# Patient Record
Sex: FEMALE
[De-identification: names, ages, dates, MRNs, and addresses within clinical notes are randomized per-mention and may not be internally consistent; named-entity substitution may affect disease eponyms.]

---

## 2018-06-11 LAB
ALBUMIN SERPL-MCNC: 3.3 G/DL (ref 3.5–5)
ALBUMIN/GLOB SERPL: 0.9 {RATIO} (ref 0.8–2)
ALP SERPL-CCNC: 133 IU/L (ref 40–150)
ALT SERPL-CCNC: 36 IU/L (ref 0–55)
ANION GAP SERPL CALC-SCNC: 12 MMOL/L (ref 8–16)
BASOPHILS # BLD AUTO: 0.1 10*3/UL (ref 0–0.1)
BASOPHILS NFR BLD AUTO: 0.6 % (ref 0–1)
BUN SERPL-MCNC: 9 MG/DL (ref 7–26)
BUN/CREAT SERPL: 12 (ref 6–25)
CALCIUM SERPL-MCNC: 9.3 MG/DL (ref 8.4–10.2)
CHLORIDE SERPL-SCNC: 105 MMOL/L (ref 98–107)
CO2 SERPL-SCNC: 23 MMOL/L (ref 22–29)
DEPRECATED APTT PLAS QN: 34.5 SECONDS (ref 23.8–35.5)
DEPRECATED INR PLAS: 1.32
DEPRECATED NEUTROPHILS # BLD AUTO: 2.7 10*3/UL (ref 2.1–6.9)
EGFRCR SERPLBLD CKD-EPI 2021: > 60 ML/MIN (ref 60–?)
EOSINOPHIL # BLD AUTO: 0.6 10*3/UL (ref 0–0.4)
EOSINOPHIL NFR BLD AUTO: 6.9 % (ref 0–6)
ERYTHROCYTE [DISTWIDTH] IN CORD BLOOD: 13.2 % (ref 11.7–14.4)
GLOBULIN PLAS-MCNC: 3.7 G/DL (ref 2.3–3.5)
GLUCOSE SERPLBLD-MCNC: 83 MG/DL (ref 74–118)
HCT VFR BLD AUTO: 39.4 % (ref 34.2–44.1)
HGB BLD-MCNC: 13.7 G/DL (ref 12–16)
LYMPHOCYTES # BLD: 3.9 10*3/UL (ref 1–3.2)
LYMPHOCYTES NFR BLD AUTO: 49.2 % (ref 18–39.1)
MCH RBC QN AUTO: 35.3 PG (ref 28–32)
MCHC RBC AUTO-ENTMCNC: 34.8 G/DL (ref 31–35)
MCV RBC AUTO: 101.5 FL (ref 81–99)
MONOCYTES # BLD AUTO: 0.8 10*3/UL (ref 0.2–0.8)
MONOCYTES NFR BLD AUTO: 9.5 % (ref 4.4–11.3)
NEUTS SEG NFR BLD AUTO: 33.5 % (ref 38.7–80)
PLATELET # BLD AUTO: 109 X10E3/UL (ref 140–360)
POTASSIUM SERPL-SCNC: 4 MMOL/L (ref 3.5–5.1)
PROTHROMBIN TIME: 15.4 SECONDS (ref 11.9–14.5)
RBC # BLD AUTO: 3.88 X10E6/UL (ref 3.6–5.1)
SODIUM SERPL-SCNC: 136 MMOL/L (ref 136–145)

## 2018-06-11 NOTE — DIAGNOSTIC IMAGING REPORT
PROCEDURE:

Frontal and lateral views of the chest.

 

COMPARISON: None.

 

INDICATIONS:   PREOP CXR. Left foot surgery.

     

FINDINGS:

Lines/tubes:  None.

 

Lungs:  The lungs are well-inflated. Bilateral peribronchial cuffing.

 

Pleura:  There is no pleural effusion or pneumothorax.

 

Heart and mediastinum:  The heart and the mediastinum are normal.

 

Bones:  No acute bony abnormality.

 

IMPRESSION: 

 

Bilateral peribronchial cuffing, likely viral etiology or reactive 

airway. No focal consolidative pneumonia. 

 

Dictated by:  Don Espinosa M.D. on 6/11/2018 at 17:15     

Electronically approved by:  Don Espinosa M.D. on 6/11/2018 at 17:15

## 2018-06-15 ENCOUNTER — HOSPITAL ENCOUNTER (OUTPATIENT)
Dept: HOSPITAL 88 - OR | Age: 68
Discharge: HOME | End: 2018-06-15
Attending: PODIATRIST
Payer: MEDICARE

## 2018-06-15 DIAGNOSIS — K74.60: ICD-10-CM

## 2018-06-15 DIAGNOSIS — Z88.5: ICD-10-CM

## 2018-06-15 DIAGNOSIS — M66.872: Primary | ICD-10-CM

## 2018-06-15 DIAGNOSIS — M21.42: ICD-10-CM

## 2018-06-15 DIAGNOSIS — Z01.812: ICD-10-CM

## 2018-06-15 DIAGNOSIS — M24.275: ICD-10-CM

## 2018-06-15 DIAGNOSIS — Z01.810: ICD-10-CM

## 2018-06-15 DIAGNOSIS — Z87.01: ICD-10-CM

## 2018-06-15 DIAGNOSIS — Z01.818: ICD-10-CM

## 2018-06-15 DIAGNOSIS — Z88.8: ICD-10-CM

## 2018-06-15 DIAGNOSIS — M19.072: ICD-10-CM

## 2018-06-15 PROCEDURE — 85730 THROMBOPLASTIN TIME PARTIAL: CPT

## 2018-06-15 PROCEDURE — 36415 COLL VENOUS BLD VENIPUNCTURE: CPT

## 2018-06-15 PROCEDURE — 93005 ELECTROCARDIOGRAM TRACING: CPT

## 2018-06-15 PROCEDURE — 85610 PROTHROMBIN TIME: CPT

## 2018-06-15 PROCEDURE — 85025 COMPLETE CBC W/AUTO DIFF WBC: CPT

## 2018-06-15 PROCEDURE — 28730 FUSION OF FOOT BONES: CPT

## 2018-06-15 PROCEDURE — 28200 REPAIR OF FOOT TENDON: CPT

## 2018-06-15 PROCEDURE — 80053 COMPREHEN METABOLIC PANEL: CPT

## 2018-06-15 PROCEDURE — 76001: CPT

## 2018-06-15 PROCEDURE — 71046 X-RAY EXAM CHEST 2 VIEWS: CPT

## 2018-06-17 NOTE — OPERATIVE REPORT
DATE OF PROCEDURE:  Sara 15, 2018 

 

PREOPERATIVE DIAGNOSES 

1.  Posterior tibial tendon rupture, left foot.  

2.  Degenerative joint disease and collapse of the longitudinal arch, 

including the 1st metatarsal cuneiform joint, 2nd metatarsal cuneiform 

joint, and Lisfranc ligament.  



POSTOPERATIVE DIAGNOSES  

1.  Posterior tibial tendon rupture, left foot.  

2.  Degenerative joint disease and collapse of the longitudinal arch, 

including the 1st metatarsal cuneiform joint, 2nd metatarsal cuneiform 

joint, and Lisfranc ligament.  



TITLE OF OPERATIONS:  

1.  Posterior tibial tendon repair with #2 FiberWire.

2.  Fusion of the medial column, including the 1st metatarsal cuneiform 

joint, the 2nd tarsometatarsal joint, and the intermetatarsal joint between 

the base of the 1st metatarsal and 2nd metatarsal utilizing plate and 

screws.



PROCEDURE IN DETAIL:  The patient was taken to the operating room in a 

mildly state and placed on the operating table in the supine position.  

Following induction of general anesthetic, the left lower extremity was 

elevated to 60 degrees to exsanguinate before inflating the pneumatic thigh 

tourniquet to 350 mmHg to create hemostasis.  The right lower extremity was 

placed upon the operating table prior to performing the following 

procedure.  



PROCEDURE #1:  Fusion of the 1st metatarsal cuneiform joint and 

intermetatarsal cuneiform joint.  A linear longitudinal incision was made 

overlying the dorsal aspect of the 1st and 2nd metatarsal at the area of 

the Lisfranc ligament.  It was noted that there was a diastasis between the 

1st and 2nd metatarsals.  There was also severe degenerative joint disease 

at the base of the 1st and 2nd metatarsals at the metatarsal cuneiform 

joints.  An osteotome and mallet as well as oscillating saw were used to 

remove and roughen the area between the 1st and 2nd metatarsal.  A curette 

was used also to facilitate removal of all diacritic remnants of the 

Lisfranc ligament.  That area having thus been prepared, a 

through-and-through 4 screw cannulated was advanced across the Lisfranc 

joint creating a fusion of the Lisfranc joint.  The through-and-through 

fusion was with a long screw from [facility].  This having been 

appropriately approximated, attention was directed to the dorsal aspect of 

the 1st and 2nd metatarsal cuneiform joints.  They were both debrided with 

removal of all cartilage to create an in situ fusion.  Curette was used to 

facilitate.  The area was then reapproximated under fluoroscopy, and a 

U-plate with compression holes was used with both locking and nonlocking 

screws to facilitate the 1st and 2nd metatarsal cuneiform joint fusion with 

Lisfranc fusions.  This having been accomplished, the area was irrigated 

with copious amounts of sterile saline solution.  Deep closure was 3-0 

Vicryl.  Subcutaneous closure was 4-0 Vicryl and skin closure was 4-0 

nylon.  



Attention was directed to the medial aspect of the navicular prominence 

where the ruptured posterior tibial tendon was noted after dissecting.  

That tendon had longitudinal ruptures and fraying.  An incision to debride 

was performed.  Approximately, 4 cm of the tenosynovitis and ruptured 

tendon were debrided.  This was all repaired with a #2 FiberWire buried in 

the central core utilizing a continuous interlocking suture.  This having 

been accomplished, the area was irrigated with copious amounts of sterile 

saline solution.  Fluoroscopy images were obtained.  Deep closure was 3-0 

Vicryl.  The capsular and peritenon closure was 4-0 Vicryl and 4-0 nylon on 

the skin.  The areas of surgery were all blocked with 0.5 Marcaine.  A 

human tissue allograft was used into intratendinous structure in order to 

facilitate healing.  This was placed in the area of the Lisfranc ligament 

fusion as well.  This having been accomplished, the appropriate mildly 

compressive dressings were applied.  Posterior splint was applied.  Release 

of the pneumatic thigh tourniquet showed a normal hyperemic flush to all 

digits of the left foot.  The patient tolerated both anesthetic and 

procedure very well.    









DD:  06/16/2018 23:00

DT:  06/17/2018 07:20

Job#:  [id number]

## 2022-03-26 ENCOUNTER — HOSPITAL ENCOUNTER (EMERGENCY)
Dept: HOSPITAL 88 - FSED | Age: 72
Discharge: HOME | End: 2022-03-26
Payer: MEDICARE

## 2022-03-26 VITALS — WEIGHT: 170 LBS | HEIGHT: 62 IN | BODY MASS INDEX: 31.28 KG/M2

## 2022-03-26 DIAGNOSIS — M25.572: ICD-10-CM

## 2022-03-26 DIAGNOSIS — R05.9: ICD-10-CM

## 2022-03-26 DIAGNOSIS — R53.81: ICD-10-CM

## 2022-03-26 DIAGNOSIS — M79.672: Primary | ICD-10-CM

## 2022-03-26 DIAGNOSIS — R53.1: ICD-10-CM

## 2022-03-26 PROCEDURE — 99283 EMERGENCY DEPT VISIT LOW MDM: CPT
